# Patient Record
Sex: FEMALE | URBAN - METROPOLITAN AREA
[De-identification: names, ages, dates, MRNs, and addresses within clinical notes are randomized per-mention and may not be internally consistent; named-entity substitution may affect disease eponyms.]

---

## 2023-12-13 ENCOUNTER — APPOINTMENT (RX ONLY)
Dept: URBAN - METROPOLITAN AREA CLINIC 43 | Facility: CLINIC | Age: 73
Setting detail: DERMATOLOGY
End: 2023-12-13

## 2023-12-13 DIAGNOSIS — L29.89 OTHER PRURITUS: ICD-10-CM

## 2023-12-13 DIAGNOSIS — L29.8 OTHER PRURITUS: ICD-10-CM

## 2023-12-13 PROCEDURE — 99212 OFFICE O/P EST SF 10 MIN: CPT

## 2023-12-13 PROCEDURE — ? TREATMENT REGIMEN

## 2023-12-13 PROCEDURE — ? PRESCRIPTION

## 2023-12-13 RX ORDER — CLOBETASOL PROPIONATE 0.5 MG/ML
SOLUTION TOPICAL
Qty: 50 | Refills: 3 | Status: ERX | COMMUNITY
Start: 2023-12-13

## 2023-12-13 RX ADMIN — CLOBETASOL PROPIONATE: 0.5 SOLUTION TOPICAL at 00:00

## 2023-12-13 ASSESSMENT — LOCATION DETAILED DESCRIPTION DERM
LOCATION DETAILED: LEFT MEDIAL FRONTAL SCALP
LOCATION DETAILED: RIGHT PROXIMAL DORSAL FOREARM
LOCATION DETAILED: LEFT CENTRAL PARIETAL SCALP
LOCATION DETAILED: LEFT PROXIMAL DORSAL FOREARM

## 2023-12-13 ASSESSMENT — LOCATION ZONE DERM
LOCATION ZONE: ARM
LOCATION ZONE: SCALP

## 2023-12-13 ASSESSMENT — LOCATION SIMPLE DESCRIPTION DERM
LOCATION SIMPLE: LEFT SCALP
LOCATION SIMPLE: SCALP
LOCATION SIMPLE: LEFT FOREARM
LOCATION SIMPLE: RIGHT FOREARM

## 2023-12-13 NOTE — HPI: RASH
What Type Of Note Output Would You Prefer (Optional)?: Standard Output
Is The Patient Presenting As Previously Scheduled?: Yes
How Severe Is Your Rash?: moderate
Is This A New Presentation, Or A Follow-Up?: Rash
Additional History: Pt states the itching is worse at night.

## 2023-12-13 NOTE — PROCEDURE: TREATMENT REGIMEN
Detail Level: Zone
Plan: today her skin is clear. \\nShe is itchy on her arms and scalp. \\nPrior has had a rash on arms that resolves with topical triamcinolone cream. \\nWe discussed possible causes. She does have metastatic breast cancer and is monitored monthly. \\nWill have her start using Vanicream moisturizer daily. \\nIf itchy on arms, may use TAC cream twice daily for 1-2 weeks. Then may use it once or twice a week as needed to help prevent flares. \\nFor scalp; trial of clobetasol scalp solution. \\nIf worsening or if rash flares, please notify clinic.

## 2024-03-14 ENCOUNTER — APPOINTMENT (RX ONLY)
Dept: URBAN - METROPOLITAN AREA CLINIC 43 | Facility: CLINIC | Age: 74
Setting detail: DERMATOLOGY
End: 2024-03-14

## 2024-03-14 DIAGNOSIS — L29.89 OTHER PRURITUS: ICD-10-CM

## 2024-03-14 DIAGNOSIS — L29.8 OTHER PRURITUS: ICD-10-CM

## 2024-03-14 PROCEDURE — ? TREATMENT REGIMEN

## 2024-03-14 PROCEDURE — 99212 OFFICE O/P EST SF 10 MIN: CPT

## 2024-03-14 ASSESSMENT — LOCATION SIMPLE DESCRIPTION DERM
LOCATION SIMPLE: SCALP
LOCATION SIMPLE: RIGHT FOREARM
LOCATION SIMPLE: LEFT FOREARM

## 2024-03-14 ASSESSMENT — LOCATION DETAILED DESCRIPTION DERM
LOCATION DETAILED: RIGHT PROXIMAL DORSAL FOREARM
LOCATION DETAILED: LEFT PROXIMAL DORSAL FOREARM
LOCATION DETAILED: LEFT CENTRAL PARIETAL SCALP

## 2024-03-14 ASSESSMENT — LOCATION ZONE DERM
LOCATION ZONE: SCALP
LOCATION ZONE: ARM

## 2024-03-14 NOTE — PROCEDURE: TREATMENT REGIMEN
Detail Level: Zone
Plan: Skin is still clear. She is intermittently itchy, mostly on arms and scalp. She has metastatic breast cancer. \\n\\nWe discussed possible causes, including nerve-related itch. \\nShe can continue Vanicream daily. If itchy on arms, may use TAC cream twice daily for 1-2 weeks. Then may use it once or twice a week as needed to help prevent flares. For scalp; start trial of clobetasol scalp solution. Explained how to use it. \\nReviewed risks of skin thinning with overuse of topical steroid. \\n\\nI provided her with CeraVe anti-itch samples to use as needed. She is to avoid hot showers. \\n\\nWe will get most recent labs from Dr. Lira at [facility]. (Labs drawn 2 weeks ago)\\nIf worsening or if rash flares, she is to notify clinic.\\nPlan to call once most recent labs are received and will discuss plan further.